# Patient Record
Sex: MALE | Race: WHITE | NOT HISPANIC OR LATINO | Employment: FULL TIME | URBAN - NONMETROPOLITAN AREA
[De-identification: names, ages, dates, MRNs, and addresses within clinical notes are randomized per-mention and may not be internally consistent; named-entity substitution may affect disease eponyms.]

---

## 2024-06-03 ENCOUNTER — ANESTHESIA EVENT (OUTPATIENT)
Dept: GASTROENTEROLOGY | Facility: HOSPITAL | Age: 59
End: 2024-06-03
Payer: OTHER GOVERNMENT

## 2024-06-03 ENCOUNTER — HOSPITAL ENCOUNTER (OUTPATIENT)
Dept: PREADMISSION TESTING | Facility: HOSPITAL | Age: 59
Discharge: HOME | End: 2024-06-03
Attending: INTERNAL MEDICINE
Payer: OTHER GOVERNMENT

## 2024-06-03 VITALS — WEIGHT: 269.19 LBS | HEIGHT: 76 IN | BODY MASS INDEX: 32.78 KG/M2

## 2024-06-03 DIAGNOSIS — Z12.11 SCREENING FOR COLON CANCER: Primary | ICD-10-CM

## 2024-06-03 RX ORDER — SODIUM CHLORIDE, SODIUM LACTATE, POTASSIUM CHLORIDE, CALCIUM CHLORIDE 600; 310; 30; 20 MG/100ML; MG/100ML; MG/100ML; MG/100ML
INJECTION, SOLUTION INTRAVENOUS CONTINUOUS
Status: CANCELLED | OUTPATIENT
Start: 2024-06-03

## 2024-06-03 NOTE — ANESTHESIA PREPROCEDURE EVALUATION
06/03/2024  Eber Salamanca is a 59 y.o., male.  Anesthesia History    No specialty history recorded    Other Medical History   Arthritis      Surgical History    No surgical history recorded     COVID-19 Risk of Complications  Current as of 2 hours ago    3 0 to < 3 Points: Low Risk   3 to < 6 Points: Medium Risk   6 to 16 Points: High Risk     Last Change:       Details   Substance History    Smoking Status: Every Day   Passive Exposure: Current   Smokeless Tobacco Status: Current   Alcohol use: Yes, unspecified volume   Drug use: Never     Anesthesia Family History    No family medical history recorded     Current Gender Identity    Questions Responses   Patient's Gender Identity: Male   Patient's sex assigned at birth:           Pre-op Assessment    I have reviewed the Patient Summary Reports.     I have reviewed the Nursing Notes. I have reviewed the NPO Status.   I have reviewed the Medications.     Review of Systems  Anesthesia Hx:  No problems with previous Anesthesia             Denies Family Hx of Anesthesia complications.    Denies Personal Hx of Anesthesia complications.                    Social:  Smoker, Alcohol Use       Hematology/Oncology:  Hematology Normal   Oncology Normal                                   EENT/Dental:  EENT/Dental Normal           Cardiovascular:  Cardiovascular Normal Exercise tolerance: poor                                           Pulmonary:  Pulmonary Normal                       Renal/:  Renal/ Normal                 Hepatic/GI:  Hepatic/GI Normal                 Musculoskeletal:  Arthritis               Neurological:  Neurology Normal                                      Endocrine:  Endocrine Normal          Obesity / BMI > 30  Dermatological:  Skin Normal    Psych:  Psychiatric Normal                    Physical Exam  General: Well nourished, Cooperative, Alert  and Oriented    Airway:  Mallampati: II / II  Mouth Opening: Normal  TM Distance: Normal  Tongue: Normal  Neck ROM: Normal ROM    Dental:  Intact        Anesthesia Plan  Type of Anesthesia, risks & benefits discussed:    Anesthesia Type: MAC  Intra-op Monitoring Plan: Standard ASA Monitors  Post Op Pain Control Plan: multimodal analgesia  Induction:  IV  Informed Consent: Informed consent signed with the Patient and all parties understand the risks and agree with anesthesia plan.  All questions answered. Patient consented to blood products? Yes  ASA Score: 3  Day of Surgery Review of History & Physical: H&P Update referred to the surgeon/provider.I have interviewed and examined the patient. I have reviewed the patient's H&P dated: There are no significant changes.     Ready For Surgery From Anesthesia Perspective.     .

## 2024-06-03 NOTE — DISCHARGE INSTRUCTIONS

## 2024-06-05 ENCOUNTER — ANESTHESIA (OUTPATIENT)
Dept: GASTROENTEROLOGY | Facility: HOSPITAL | Age: 59
End: 2024-06-05
Payer: OTHER GOVERNMENT

## 2024-06-05 ENCOUNTER — HOSPITAL ENCOUNTER (OUTPATIENT)
Dept: GASTROENTEROLOGY | Facility: HOSPITAL | Age: 59
Discharge: HOME | End: 2024-06-05
Attending: INTERNAL MEDICINE
Payer: OTHER GOVERNMENT

## 2024-06-05 VITALS
OXYGEN SATURATION: 98 % | DIASTOLIC BLOOD PRESSURE: 76 MMHG | SYSTOLIC BLOOD PRESSURE: 128 MMHG | RESPIRATION RATE: 20 BRPM | TEMPERATURE: 98 F | HEART RATE: 52 BPM | BODY MASS INDEX: 32.78 KG/M2 | HEIGHT: 76 IN | WEIGHT: 269.19 LBS

## 2024-06-05 DIAGNOSIS — Z12.11 SCREENING FOR COLON CANCER: ICD-10-CM

## 2024-06-05 DIAGNOSIS — Z12.11 COLON CANCER SCREENING: ICD-10-CM

## 2024-06-05 PROCEDURE — 63600175 PHARM REV CODE 636 W HCPCS: Performed by: INTERNAL MEDICINE

## 2024-06-05 PROCEDURE — 45380 COLONOSCOPY AND BIOPSY: CPT | Mod: PT | Performed by: INTERNAL MEDICINE

## 2024-06-05 PROCEDURE — 37000008 HC ANESTHESIA 1ST 15 MINUTES

## 2024-06-05 PROCEDURE — 63600175 PHARM REV CODE 636 W HCPCS: Performed by: NURSE ANESTHETIST, CERTIFIED REGISTERED

## 2024-06-05 PROCEDURE — 25000003 PHARM REV CODE 250: Performed by: NURSE ANESTHETIST, CERTIFIED REGISTERED

## 2024-06-05 PROCEDURE — D9220A PRA ANESTHESIA: Mod: PT,,, | Performed by: NURSE ANESTHETIST, CERTIFIED REGISTERED

## 2024-06-05 PROCEDURE — 37000009 HC ANESTHESIA EA ADD 15 MINS

## 2024-06-05 PROCEDURE — 27201423 OPTIME MED/SURG SUP & DEVICES STERILE SUPPLY

## 2024-06-05 RX ORDER — LIDOCAINE HYDROCHLORIDE 20 MG/ML
INJECTION INTRAVENOUS
Status: DISCONTINUED | OUTPATIENT
Start: 2024-06-05 | End: 2024-06-05

## 2024-06-05 RX ORDER — GLYCOPYRROLATE 0.2 MG/ML
INJECTION INTRAMUSCULAR; INTRAVENOUS
Status: DISCONTINUED | OUTPATIENT
Start: 2024-06-05 | End: 2024-06-05

## 2024-06-05 RX ORDER — PROPOFOL 10 MG/ML
VIAL (ML) INTRAVENOUS
Status: DISCONTINUED | OUTPATIENT
Start: 2024-06-05 | End: 2024-06-05

## 2024-06-05 RX ORDER — SODIUM CHLORIDE, SODIUM LACTATE, POTASSIUM CHLORIDE, CALCIUM CHLORIDE 600; 310; 30; 20 MG/100ML; MG/100ML; MG/100ML; MG/100ML
INJECTION, SOLUTION INTRAVENOUS CONTINUOUS
Status: DISCONTINUED | OUTPATIENT
Start: 2024-06-05 | End: 2024-06-06 | Stop reason: HOSPADM

## 2024-06-05 RX ADMIN — PROPOFOL 30 MG: 10 INJECTION, EMULSION INTRAVENOUS at 08:06

## 2024-06-05 RX ADMIN — PROPOFOL 30 MG: 10 INJECTION, EMULSION INTRAVENOUS at 07:06

## 2024-06-05 RX ADMIN — LIDOCAINE HYDROCHLORIDE 50 MG: 20 INJECTION, SOLUTION INTRAVENOUS at 07:06

## 2024-06-05 RX ADMIN — PROPOFOL 100 MG: 10 INJECTION, EMULSION INTRAVENOUS at 07:06

## 2024-06-05 RX ADMIN — GLYCOPYRROLATE 0.2 MG: 0.2 INJECTION INTRAMUSCULAR; INTRAVENOUS at 07:06

## 2024-06-05 RX ADMIN — SODIUM CHLORIDE, POTASSIUM CHLORIDE, SODIUM LACTATE AND CALCIUM CHLORIDE: 600; 310; 30; 20 INJECTION, SOLUTION INTRAVENOUS at 06:06

## 2024-06-05 NOTE — ANESTHESIA POSTPROCEDURE EVALUATION
Anesthesia Post Evaluation    Patient: Eber Salamanca    Procedure(s) Performed: * No procedures listed *    Final Anesthesia Type: MAC      Patient location during evaluation: GI PACU  Patient participation: Yes- Able to Participate  Level of consciousness: awake and alert, awake and oriented  Post-procedure vital signs: reviewed and stable  Pain management: adequate  Airway patency: patent    PONV status at discharge: No PONV  Anesthetic complications: no      Cardiovascular status: blood pressure returned to baseline  Respiratory status: unassisted, room air and spontaneous ventilation  Hydration status: euvolemic  Follow-up not needed.              Vitals Value Taken Time   /87 06/05/24 0600   Temp 36.2 °C (97.1 °F) 06/05/24 0600   Pulse 69 06/05/24 0600   Resp 20 06/05/24 0600   SpO2 95 % 06/05/24 0600         No case tracking events are documented in the log.      Pain/Roger Score: No data recorded

## 2024-06-05 NOTE — H&P
[facility]     History & Physical        Patient Name: Eber Salamanca  MRN: [id number]  Admission Date: 6/5/2024  Attending Physician: Rolly Leyva MD  Primary Care Provider: [facility]              Subjective:            Chief Complaint:  Here for a colonoscopy        HPI:  Patient is a 59-year-old [facility] who is in need of a screening colonoscopy.  He denies any signs or symptoms.          Past Medical History:   Diagnosis Date    Arthritis           No past surgical history on file.     Review of patient's allergies indicates:  No Known Allergies     No current outpatient medications on file prior to visit.      No current facility-administered medications on file prior to visit.      Family History    None               Tobacco Use    Smoking status: Every Day       Types: Cigarettes       Passive exposure: Current    Smokeless tobacco: Current       Types: Snuff, Chew   Substance and Sexual Activity    Alcohol use: Yes    Drug use: Never    Sexual activity: Yes       Partners: Female      Review of Systems   All other systems reviewed and are negative.  Objective:      Vital Signs (Most Recent): Vital Signs (24h Range):      There is no height or weight on file to calculate BMI.     Physical Exam  Constitutional:       Appearance: Normal appearance.   HENT:      Head: Normocephalic and atraumatic.   Eyes:      Extraocular Movements: Extraocular movements intact.      Pupils: Pupils are equal, round, and reactive to light.   Cardiovascular:      Rate and Rhythm: Normal rate and regular rhythm.      Pulses: Normal pulses.   Pulmonary:      Effort: Pulmonary effort is normal.      Breath sounds: Normal breath sounds.   Abdominal:      General: Abdomen is flat.      Palpations: Abdomen is soft.   Musculoskeletal:         General: Normal range of motion.      Cervical back: Normal range of motion and neck supple.   Skin:     General: Skin is warm.    Neurological:      General: No focal deficit present.      Mental Status: He is alert and oriented to person, place, and time.   Psychiatric:         Mood and Affect: Mood normal.         CRANIAL NERVES      CN III, IV, VI   Pupils are equal, round, and reactive to light.           Assessment/Plan:      Impression:  59-year-old male in need of screening colonoscopy     Plan: Proceed with screening colonoscopy     VTE Risk Mitigation (From admission, onward)        None                   Rolly Leyva MD  Department of [facility] Medicine   [facility]

## 2024-06-05 NOTE — PROGRESS NOTES
Discharge instructions provided to patient and family, allowed time for questions, verbalized understanding. Discharged home in stable condition via ambulation per pt preference,declined wheel chair, gait steady, accompanied by family, no s/s of distress noted

## 2024-06-05 NOTE — PROGRESS NOTES
[facility]    History & Physical      Patient Name: Eber Salamanca  MRN: [id number]  Admission Date: 6/5/2024  Attending Physician: Rolly Leyva MD  Primary Care Provider: [facility]           Subjective:         Chief Complaint:  Here for a colonoscopy       HPI:  Patient is a 59-year-old male [facility] who is in need of a screening colonoscopy.  He denies any signs or symptoms.    Past Medical History:   Diagnosis Date    Arthritis        No past surgical history on file.    Review of patient's allergies indicates:  No Known Allergies    No current outpatient medications on file prior to visit.     No current facility-administered medications on file prior to visit.     Family History    None       Tobacco Use    Smoking status: Every Day     Types: Cigarettes     Passive exposure: Current    Smokeless tobacco: Current     Types: Snuff, Chew   Substance and Sexual Activity    Alcohol use: Yes    Drug use: Never    Sexual activity: Yes     Partners: Female     Review of Systems   All other systems reviewed and are negative.  Objective:     Vital Signs (Most Recent):    Vital Signs (24h Range):           There is no height or weight on file to calculate BMI.    Physical Exam  Constitutional:       Appearance: Normal appearance.   HENT:      Head: Normocephalic and atraumatic.   Eyes:      Extraocular Movements: Extraocular movements intact.      Pupils: Pupils are equal, round, and reactive to light.   Cardiovascular:      Rate and Rhythm: Normal rate and regular rhythm.      Pulses: Normal pulses.   Pulmonary:      Effort: Pulmonary effort is normal.      Breath sounds: Normal breath sounds.   Abdominal:      General: Abdomen is flat.      Palpations: Abdomen is soft.   Musculoskeletal:         General: Normal range of motion.      Cervical back: Normal range of motion and neck supple.   Skin:     General: Skin is warm.   Neurological:      General: No focal deficit present.       Mental Status: He is alert and oriented to person, place, and time.   Psychiatric:         Mood and Affect: Mood normal.       CRANIAL NERVES     CN III, IV, VI   Pupils are equal, round, and reactive to light.        Assessment/Plan:     Impression:  59-year-old male in need of screening colonoscopy    Plan: Proceed with screening colonoscopy    VTE Risk Mitigation (From admission, onward)      None              Rolly Leyva MD  Department of Hospital Medicine   [facility]

## 2024-06-05 NOTE — PROGRESS NOTES
PT IS BACK TO ROOM, AWAKE AND ALERT, IN STABLE CONDITION, NO DIFFICULTY BREATHING OR SWALLOWING, NO RECTAL BLEEDING NOTED, + FLATUS NOTED, PT IS TOLERATING LIQUIDS, FAMILY AT SIDE, SR X2, CB IN REACH

## 2024-06-05 NOTE — DISCHARGE SUMMARY
[facility]  Discharge Note  Short Stay    Colonoscopy      OUTCOME: Condition has improved and patient is now ready for discharge.    DISPOSITION: Home or Self Care    FINAL DIAGNOSIS:  <principal problem not specified>    FOLLOWUP:  Sessile polyp at 30 cm status post polypectomy with cold biopsy forceps    DISCHARGE INSTRUCTIONS:  No discharge procedures on file.      Clinical Reference Documents Added to Patient Instructions         Document    COLON POLYPS (ENGLISH)    COLONOSCOPY DISCHARGE INSTRUCTIONS (ENGLISH)

## 2024-06-05 NOTE — DISCHARGE INSTRUCTIONS
Follow-up with Dr Leyva today, immediately after discharge  Diet: as tolerated  Activity:  decrease activity today, no driving today, resume all activity tomorrow  Notify MD:  increased swelling of abdomen, excessive nausea/vomiting, excessive bright red bleeding from rectum  Medications:  continue your home medications. Keep a list of your home medications at all times for emergencies.

## 2024-09-27 DIAGNOSIS — Z87.891 PERSONAL HISTORY OF TOBACCO USE, PRESENTING HAZARDS TO HEALTH: Primary | ICD-10-CM

## 2024-10-11 ENCOUNTER — HOSPITAL ENCOUNTER (OUTPATIENT)
Dept: RADIOLOGY | Facility: HOSPITAL | Age: 59
Discharge: HOME | End: 2024-10-11
Attending: INTERNAL MEDICINE
Payer: OTHER GOVERNMENT

## 2024-10-11 ENCOUNTER — HOSPITAL ENCOUNTER (OUTPATIENT)
Dept: RADIOLOGY | Facility: HOSPITAL | Age: 59
Discharge: HOME | End: 2024-10-11
Attending: STUDENT IN AN ORGANIZED HEALTH CARE EDUCATION/TRAINING PROGRAM
Payer: OTHER GOVERNMENT

## 2024-10-11 DIAGNOSIS — Z87.891 PERSONAL HISTORY OF TOBACCO USE, PRESENTING HAZARDS TO HEALTH: ICD-10-CM

## 2024-10-11 DIAGNOSIS — E04.1 NONTOXIC UNINODULAR GOITER: ICD-10-CM

## 2024-10-11 PROCEDURE — 76536 US EXAM OF HEAD AND NECK: CPT | Mod: TC

## 2024-10-11 PROCEDURE — 71271 CT THORAX LUNG CANCER SCR C-: CPT | Mod: TC
